# Patient Record
Sex: MALE
[De-identification: names, ages, dates, MRNs, and addresses within clinical notes are randomized per-mention and may not be internally consistent; named-entity substitution may affect disease eponyms.]

---

## 2024-02-26 ENCOUNTER — NON-APPOINTMENT (OUTPATIENT)
Age: 79
End: 2024-02-26

## 2024-02-26 PROBLEM — Z00.00 ENCOUNTER FOR PREVENTIVE HEALTH EXAMINATION: Status: ACTIVE | Noted: 2024-02-26

## 2024-02-27 ENCOUNTER — APPOINTMENT (OUTPATIENT)
Dept: DERMATOLOGY | Facility: CLINIC | Age: 79
End: 2024-02-27
Payer: MEDICARE

## 2024-02-27 DIAGNOSIS — D48.9 NEOPLASM OF UNCERTAIN BEHAVIOR, UNSPECIFIED: ICD-10-CM

## 2024-02-27 DIAGNOSIS — L29.9 PRURITUS, UNSPECIFIED: ICD-10-CM

## 2024-02-27 PROCEDURE — 11104 PUNCH BX SKIN SINGLE LESION: CPT

## 2024-02-27 PROCEDURE — 99204 OFFICE O/P NEW MOD 45 MIN: CPT | Mod: 25

## 2024-02-27 RX ORDER — HYDROXYZINE HYDROCHLORIDE 10 MG/1
10 TABLET ORAL
Qty: 30 | Refills: 2 | Status: ACTIVE | COMMUNITY
Start: 2024-02-27 | End: 1900-01-01

## 2024-02-27 RX ORDER — CLOBETASOL PROPIONATE 0.5 MG/G
0.05 OINTMENT TOPICAL
Qty: 1 | Refills: 1 | Status: ACTIVE | COMMUNITY
Start: 2024-02-27 | End: 1900-01-01

## 2024-02-27 NOTE — PHYSICAL EXAM
[Alert] : alert [Oriented x 3] : ~L oriented x 3 [Well Nourished] : well nourished [Conjunctiva Non-injected] : conjunctiva non-injected [No Visual Lymphadenopathy] : no visual  lymphadenopathy [No Clubbing] : no clubbing [No Edema] : no edema [No Bromhidrosis] : no bromhidrosis [No Chromhidrosis] : no chromhidrosis [Face] : Face [FreeTextEntry3] : upper back, mid back, lower back, lateral sides, midline chest: there are many erythematous papules and plaques with crusted round to linear excoriations.   scalp: pink scaly non indurated macule  Patient defers exam below waist. Says that he is not having symptoms there.

## 2024-02-27 NOTE — HISTORY OF PRESENT ILLNESS
[FreeTextEntry1] : NPV- rash [de-identified] : David Cooper is a 78-year-old M presents for rash and itching.   Started for 1 month, has itchy rash around his stomach, chest,  back, sides, lower back. Also has itching on scalp.  Was given rx for momentasone cream and mupirocin ointment which do not help.  Has ESRD and gets hemodialysis 3 times weekly.   PMH -ESRD on HD -AK s/p LN2

## 2024-02-27 NOTE — ASSESSMENT
[FreeTextEntry1] : #Eruptive pruritic papules and plaques on trunk/extremities #End stage renal disease on hemodialysis Favor prurigo nodularis vs. perforating disorder vs. atopic dermatitis vs. contact dermatitis   -punch biopsy from left posterior shoulder -labwork ordered: CBC, /230, CMP, SPEP, TSH -start clobetasol 0.05% ointment BID for 2 weeks on 1 week off as needed for areas of itching  -RTC  in 8-12 weeks . Will call pt with results once they are available.     Biopsy by 4mm Punch Technique Procedure Note.  Location: left posterior shoulder.  After discussion of risks, verbal consent was obtained and a time out was performed.  The area was cleaned with an alcohol swab.  Anesthesia: 1% lidocaine with 1:100,000 epinephrine.  Closure with 4-0 Chromic interrupted suture.  Specimen was sent for pathologic examination.  Wound care was reviewed with the patient.  Will contact patient with biopsy results.  #Actinic keratosis -scalp  Provided anticipatory guidance and education regarding these lesions.  Given there is a risk of malignancy without treatment, I would recommend treatment with cryotherapy.  He elected to defer treatment of this today and start treatment for above only. Will plan to readdress this when he follows up.   RTC 8-12 weeks

## 2024-04-22 ENCOUNTER — NON-APPOINTMENT (OUTPATIENT)
Age: 79
End: 2024-04-22

## 2024-05-03 LAB — DERMATOLOGY BIOPSY: NORMAL

## 2024-06-11 ENCOUNTER — APPOINTMENT (OUTPATIENT)
Dept: DERMATOLOGY | Facility: CLINIC | Age: 79
End: 2024-06-11

## 2024-06-17 ENCOUNTER — NON-APPOINTMENT (OUTPATIENT)
Age: 79
End: 2024-06-17